# Patient Record
Sex: MALE | Race: WHITE | Employment: STUDENT | URBAN - METROPOLITAN AREA
[De-identification: names, ages, dates, MRNs, and addresses within clinical notes are randomized per-mention and may not be internally consistent; named-entity substitution may affect disease eponyms.]

---
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
## 2023-10-27 ENCOUNTER — HOSPITAL ENCOUNTER (EMERGENCY)
Facility: HOSPITAL | Age: 23
Discharge: HOME | End: 2023-10-27
Payer: COMMERCIAL

## 2023-10-27 ENCOUNTER — APPOINTMENT (OUTPATIENT)
Dept: RADIOLOGY | Facility: HOSPITAL | Age: 23
End: 2023-10-27
Payer: COMMERCIAL

## 2023-10-27 VITALS
DIASTOLIC BLOOD PRESSURE: 96 MMHG | RESPIRATION RATE: 16 BRPM | HEIGHT: 70 IN | TEMPERATURE: 97.5 F | OXYGEN SATURATION: 99 % | BODY MASS INDEX: 27.2 KG/M2 | WEIGHT: 190 LBS | SYSTOLIC BLOOD PRESSURE: 151 MMHG | HEART RATE: 69 BPM

## 2023-10-27 DIAGNOSIS — S89.91XA INJURY OF RIGHT KNEE, INITIAL ENCOUNTER: Primary | ICD-10-CM

## 2023-10-27 PROCEDURE — 82075 ASSAY OF BREATH ETHANOL: CPT

## 2023-10-27 PROCEDURE — 99283 EMERGENCY DEPT VISIT LOW MDM: CPT

## 2023-10-27 PROCEDURE — 73564 X-RAY EXAM KNEE 4 OR MORE: CPT | Mod: RIGHT SIDE | Performed by: RADIOLOGY

## 2023-10-27 PROCEDURE — 99283 EMERGENCY DEPT VISIT LOW MDM: CPT | Mod: 25

## 2023-10-27 PROCEDURE — 73564 X-RAY EXAM KNEE 4 OR MORE: CPT | Mod: RT

## 2023-10-27 ASSESSMENT — LIFESTYLE VARIABLES
HAVE PEOPLE ANNOYED YOU BY CRITICIZING YOUR DRINKING: NO
EVER FELT BAD OR GUILTY ABOUT YOUR DRINKING: NO
EVER HAD A DRINK FIRST THING IN THE MORNING TO STEADY YOUR NERVES TO GET RID OF A HANGOVER: NO
REASON UNABLE TO ASSESS: NO
HAVE YOU EVER FELT YOU SHOULD CUT DOWN ON YOUR DRINKING: NO

## 2023-10-27 ASSESSMENT — PAIN SCALES - GENERAL: PAINLEVEL_OUTOF10: 8

## 2023-10-27 ASSESSMENT — COLUMBIA-SUICIDE SEVERITY RATING SCALE - C-SSRS
1. IN THE PAST MONTH, HAVE YOU WISHED YOU WERE DEAD OR WISHED YOU COULD GO TO SLEEP AND NOT WAKE UP?: NO
2. HAVE YOU ACTUALLY HAD ANY THOUGHTS OF KILLING YOURSELF?: NO
6. HAVE YOU EVER DONE ANYTHING, STARTED TO DO ANYTHING, OR PREPARED TO DO ANYTHING TO END YOUR LIFE?: NO

## 2023-10-27 ASSESSMENT — PAIN - FUNCTIONAL ASSESSMENT: PAIN_FUNCTIONAL_ASSESSMENT: 0-10

## 2023-10-27 ASSESSMENT — PAIN DESCRIPTION - DESCRIPTORS: DESCRIPTORS: ACHING

## 2023-10-27 ASSESSMENT — PAIN DESCRIPTION - PAIN TYPE: TYPE: ACUTE PAIN

## 2023-10-27 ASSESSMENT — PAIN DESCRIPTION - ORIENTATION: ORIENTATION: RIGHT

## 2023-10-27 ASSESSMENT — PAIN DESCRIPTION - LOCATION: LOCATION: KNEE

## 2024-04-23 ENCOUNTER — HOSPITAL ENCOUNTER (EMERGENCY)
Age: 24
Discharge: HOME | End: 2024-04-23
Payer: COMMERCIAL

## 2024-04-23 ENCOUNTER — APPOINTMENT (OUTPATIENT)
Dept: CT IMAGING | Age: 24
End: 2024-04-23
Payer: COMMERCIAL

## 2024-04-23 VITALS
TEMPERATURE: 97.8 F | HEIGHT: 69 IN | DIASTOLIC BLOOD PRESSURE: 96 MMHG | OXYGEN SATURATION: 98 % | BODY MASS INDEX: 31.1 KG/M2 | HEART RATE: 77 BPM | SYSTOLIC BLOOD PRESSURE: 149 MMHG | WEIGHT: 210 LBS | RESPIRATION RATE: 18 BRPM

## 2024-04-23 DIAGNOSIS — M54.31 SCIATICA OF RIGHT SIDE: ICD-10-CM

## 2024-04-23 DIAGNOSIS — S39.012A STRAIN OF LUMBAR REGION, INITIAL ENCOUNTER: Primary | ICD-10-CM

## 2024-04-23 PROCEDURE — 99284 EMERGENCY DEPT VISIT MOD MDM: CPT

## 2024-04-23 PROCEDURE — 72131 CT LUMBAR SPINE W/O DYE: CPT

## 2024-04-23 ASSESSMENT — PAIN DESCRIPTION - DESCRIPTORS: DESCRIPTORS: SHOOTING

## 2024-04-23 ASSESSMENT — PAIN DESCRIPTION - LOCATION: LOCATION: BACK

## 2024-04-23 ASSESSMENT — PAIN DESCRIPTION - ORIENTATION: ORIENTATION: LOWER

## 2024-04-23 ASSESSMENT — LIFESTYLE VARIABLES
HOW MANY STANDARD DRINKS CONTAINING ALCOHOL DO YOU HAVE ON A TYPICAL DAY: 1 OR 2
HOW OFTEN DO YOU HAVE A DRINK CONTAINING ALCOHOL: MONTHLY OR LESS

## 2024-04-23 ASSESSMENT — PAIN - FUNCTIONAL ASSESSMENT: PAIN_FUNCTIONAL_ASSESSMENT: 0-10

## 2024-04-23 ASSESSMENT — PAIN SCALES - GENERAL: PAINLEVEL_OUTOF10: 7

## 2024-04-24 NOTE — ED PROVIDER NOTES
Independent  HPI:  4/24/24, Time: 3:20 AM EDT         Prince De Paz is a 24 y.o. male presenting to the ED for lower lumbar back pain.  Patient presents to the emergency department with complaints of back pain.  States that he believes he slipped a disc.  States Saturday he was lifting up boxes and the pain has continued and it is radiating.  He denies take anything at home for pain relief.  Denies any saddle anesthesia or any unexplained incontinence.  Patient without any fevers as well as no unusual rash.  Noted to be able to ambulate easily and independently.  Never seen a specialist before either.  Patient reports that he did not notify his employer on the date that it happened, on Saturday but states that it still hurting him so he came in today to be seen.  Review of Systems:   A complete review of systems was performed and pertinent positives and negatives are stated within HPI, all other systems reviewed and are negative.          --------------------------------------------- PAST HISTORY ---------------------------------------------  Past Medical History:  has no past medical history on file.    Past Surgical History:  has no past surgical history on file.    Social History:  reports that he has never smoked. He has never used smokeless tobacco. He reports that he does not drink alcohol and does not use drugs.    Family History: family history is not on file.     The patient’s home medications have been reviewed.    Allergies: Patient has no known allergies.    -------------------------------------------------- RESULTS -------------------------------------------------  All laboratory and radiology results have been personally reviewed by myself   LABS:  No results found for this visit on 04/23/24.    RADIOLOGY:  Interpreted by Radiologist.  CT LUMBAR SPINE WO CONTRAST   Final Result   Unremarkable non-contrast CT of the lumbar spine.             ------------------------- NURSING NOTES AND VITALS

## 2024-12-12 ENCOUNTER — HOSPITAL ENCOUNTER (EMERGENCY)
Age: 24
Discharge: HOME | End: 2024-12-13
Payer: OTHER MISCELLANEOUS

## 2024-12-12 ENCOUNTER — APPOINTMENT (OUTPATIENT)
Dept: GENERAL RADIOLOGY | Age: 24
End: 2024-12-12
Payer: OTHER MISCELLANEOUS

## 2024-12-12 VITALS
DIASTOLIC BLOOD PRESSURE: 75 MMHG | RESPIRATION RATE: 22 BRPM | OXYGEN SATURATION: 98 % | SYSTOLIC BLOOD PRESSURE: 134 MMHG | HEART RATE: 81 BPM | TEMPERATURE: 98.3 F

## 2024-12-12 DIAGNOSIS — S61.412A LACERATION OF LEFT HAND WITHOUT FOREIGN BODY, INITIAL ENCOUNTER: Primary | ICD-10-CM

## 2024-12-12 PROCEDURE — 12001 RPR S/N/AX/GEN/TRNK 2.5CM/<: CPT

## 2024-12-12 PROCEDURE — 73130 X-RAY EXAM OF HAND: CPT

## 2024-12-12 PROCEDURE — 99283 EMERGENCY DEPT VISIT LOW MDM: CPT

## 2024-12-13 NOTE — DISCHARGE INSTRUCTIONS
Please return to the ED with new or worsening symptoms. Follow up with PCP and occupational medicine for recheck.

## 2024-12-13 NOTE — ED PROVIDER NOTES
Independent [facility] Visit.        HPI:  12/12/24, Time: 11:03 PM EMPERATRIZ De Paz is a 24 y.o. male presenting to the ED for laceration to the left hand, beginning just prior to arrival after he cut his hand on a piece of metal at work.  The complaint has been persistent, mild in severity, and worsened by nothing.  Patient denies any pain to the area.  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  No numbness or tingling to left hand.  Tetanus immunization less than 5 years ago.  Afebrile without recent travel or sick contacts.  Patient denies all other symptoms and injuries at this time.    Review of Systems:   A complete review of systems was performed and pertinent positives and negatives are stated within HPI, all other systems reviewed and are negative.          --------------------------------------------- PAST HISTORY ---------------------------------------------  Past Medical History:  has no past medical history on file.    Past Surgical History:  has no past surgical history on file.    Social History:  reports that he has never smoked. He has never used smokeless tobacco. He reports that he does not drink alcohol and does not use drugs.    Family History: family history is not on file.     The patient’s home medications have been reviewed.    Allergies: Patient has no known allergies.    -------------------------------------------------- RESULTS -------------------------------------------------  All laboratory and radiology results have been personally reviewed by myself   LABS:  No results found for this visit on 12/12/24.    RADIOLOGY:  Interpreted by Radiologist.  XR HAND LEFT (MIN 3 VIEWS)   Final Result   Soft tissue prominence thenar eminence region.  No radiopaque foreign body.   No acute osseous findings.      RECOMMENDATION:   In the setting of recent trauma, if there is persistent symptoms and physical   exam warrants a

## 2024-12-13 NOTE — DISCHARGE INSTR - COC
Continuity of Care Form    Patient Name: Prince De Paz   :  2000  MRN:  [id number]    Admit date:  2024  Discharge date:  ***    Code Status Order: No Order   Advance Directives:   Advance Care Flowsheet Documentation             Admitting Physician:  No admitting provider for patient encounter.  PCP: No primary care provider on file.    Discharging Nurse: ***  Discharging Hospital Unit/Room#: HALL03/H3  Discharging Unit Phone Number: ***    Emergency Contact:   Extended Emergency Contact Information  Primary Emergency Contact: Gina De Paz Legal Guardian  Address: [address] [place] [address] [id number] [place] [address]  Home Phone: [phone]  Relation: Grandparent  Secondary Emergency Contact: Stephania Molina  Home Phone: [phone]  Relation: Aunt/Uncle    Past Surgical History:  History reviewed. No pertinent surgical history.    Immunization History:     There is no immunization history on file for this patient.    Active Problems:  There is no problem list on file for this patient.      Isolation/Infection:   Isolation            No Isolation          Patient Infection Status       None to display            Nurse Assessment:  Last Vital Signs: /75   Pulse 81   Temp 98.3 °F (36.8 °C)   Resp 22   SpO2 98%     Last documented pain score (0-10 scale):    Last Weight:   Wt Readings from Last 1 Encounters:   24 95.3 kg (210 lb)     Mental Status:  {IP PT MENTAL STATUS:}    IV Access:  { ERNA IV ACCESS:576238368}    Nursing Mobility/ADLs:  Walking   {CHP DME ADLs:131476361}  Transfer  {CHP DME ADLs:126233704}  Bathing  {CHP DME ADLs:692152777}  Dressing  {CHP DME ADLs:411225932}  Toileting  {CHP DME ADLs:032066261}  Feeding  {CHP DME ADLs:740334467}  Med Admin  {CHP DME ADLs:896734270}  Med Delivery   { ERNA MED Delivery:328059505}    Wound Care Documentation and Therapy:        Elimination:  Continence:   Bowel: {YES / NO:63421}  Bladder: {YES /  NO:}  Urinary Catheter: {Urinary Catheter:559592039}   Colostomy/Ileostomy/Ileal Conduit: {YES / NO:}       Date of Last BM: ***  No intake or output data in the 24 hours ending 24 0129  No intake/output data recorded.    Safety Concerns:     { ERNA Safety Concerns:546456533}    Impairments/Disabilities:      { ERNA Impairments/Disabilities:654297656}    Nutrition Therapy:  Current Nutrition Therapy:   { ERNA Diet List:122226345}    Routes of Feeding: {[facility] DME Other Feedings:449897957}  Liquids: {Slp liquid thickness:34380}  Daily Fluid Restriction: {[facility] DME Yes amt example:363672188}  Last Modified Barium Swallow with Video (Video Swallowing Test): {Done Not Done Date:}    Treatments at the Time of Hospital Discharge:   Respiratory Treatments: ***  Oxygen Therapy:  {Therapy; copd oxygen:88482}  Ventilator:    { CC Vent List:722637550}    Rehab Therapies: {THERAPEUTIC INTERVENTION:1963789218}  Weight Bearing Status/Restrictions: {[facility] Weight Bearin}  Other Medical Equipment (for information only, NOT a DME order):  {EQUIPMENT:650613852}  Other Treatments: ***    Patient's personal belongings (please select all that are sent with patient):  {[facility] DME Belongings:117050830}    RN SIGNATURE:  {Esignature:254332032}    CASE MANAGEMENT/SOCIAL WORK SECTION    Inpatient Status Date: ***    Readmission Risk Assessment Score:  [facility] RISK OF UNPLANNED READMISSION 2.0             0 Total Score        Discharging to Facility/ Agency   Name:   Address:  Phone:  Fax:    Dialysis Facility (if applicable)   Name:  Address:  Dialysis Schedule:  Phone:  Fax:    / signature: {Esignature:128011161}    PHYSICIAN SECTION    Prognosis: {Prognosis:1344293135}    Condition at Discharge: { Patient Condition:422317251}    Rehab Potential (if transferring to Rehab): {Prognosis:2908282336}    Recommended Labs or Other Treatments After Discharge: ***    Physician Certification: I

## 2025-02-19 ENCOUNTER — HOSPITAL ENCOUNTER (EMERGENCY)
Age: 25
Discharge: HOME | End: 2025-02-19
Payer: COMMERCIAL

## 2025-02-19 VITALS
WEIGHT: 220 LBS | HEART RATE: 102 BPM | DIASTOLIC BLOOD PRESSURE: 86 MMHG | BODY MASS INDEX: 32.49 KG/M2 | SYSTOLIC BLOOD PRESSURE: 126 MMHG | TEMPERATURE: 99.1 F | OXYGEN SATURATION: 99 % | RESPIRATION RATE: 20 BRPM

## 2025-02-19 DIAGNOSIS — J01.00 ACUTE NON-RECURRENT MAXILLARY SINUSITIS: Primary | ICD-10-CM

## 2025-02-19 DIAGNOSIS — J06.9 UPPER RESPIRATORY TRACT INFECTION, UNSPECIFIED TYPE: ICD-10-CM

## 2025-02-19 PROCEDURE — 99211 OFF/OP EST MAY X REQ PHY/QHP: CPT

## 2025-02-19 RX ORDER — BENZONATATE 200 MG/1
200 CAPSULE ORAL 3 TIMES DAILY PRN
Qty: 30 CAPSULE | Refills: 0 | Status: SHIPPED | OUTPATIENT
Start: 2025-02-19 | End: 2025-03-01

## 2025-02-19 RX ORDER — METHYLPREDNISOLONE 4 MG/1
TABLET ORAL
Qty: 1 KIT | Refills: 0 | Status: SHIPPED | OUTPATIENT
Start: 2025-02-19 | End: 2025-02-25

## 2025-02-19 RX ORDER — DOXYCYCLINE HYCLATE 100 MG
100 TABLET ORAL 2 TIMES DAILY
Qty: 20 TABLET | Refills: 0 | Status: SHIPPED | OUTPATIENT
Start: 2025-02-19 | End: 2025-03-01

## 2025-02-19 RX ORDER — ALBUTEROL SULFATE 90 UG/1
2 INHALANT RESPIRATORY (INHALATION) 4 TIMES DAILY PRN
Qty: 18 G | Refills: 0 | Status: SHIPPED | OUTPATIENT
Start: 2025-02-19

## 2025-02-19 ASSESSMENT — PAIN - FUNCTIONAL ASSESSMENT: PAIN_FUNCTIONAL_ASSESSMENT: NONE - DENIES PAIN

## 2025-02-19 NOTE — ED PROVIDER NOTES
Constitutional/General: Alert and oriented x3, well appearing, non toxic.  HEENT:  Airway patent.  Posterior pharynx with mild erythema without significant swelling or exudates.  Neck: Supple, full ROM, no meningeal signs.  Respiratory: Not in respiratory distress.  Lung sounds clear bilaterally  CV:  Regular rate. Regular rhythm.   Chest: No chest wall tenderness.  GI:  Abdomen Soft, nontender, Non distended.  No rebound, guarding, or rigidity.  Musculoskeletal: Moves all extremities x 4. Warm and well perfused, no clubbing, cyanosis, or edema.   Integument: Skin warm and dry. No rashes.   Lymphatic: No lymphadenopathy noted.  Neurologic: GCS 15, no focal deficits, symmetric strength 5/5 in the upper and lower extremities bilaterally.  Psychiatric: Normal Affect.    DIAGNOSTIC RESULTS   (All laboratory and radiology results have been personally reviewed by myself)  Labs:  No results found for this visit on 02/19/25.  Imaging:  All Radiology results interpreted by Radiologist unless otherwise noted.  No orders to display       ED COURSE   Vitals:    Vitals:    02/19/25 1258 02/19/25 1315   BP: 126/86    Pulse: (!) 110 (!) 102   Resp: 20    Temp: 99.1 °F (37.3 °C)    SpO2: 99%    Weight: 99.8 kg (220 lb)        Patient was given the following medications:  Medications - No data to display       PROCEDURES   none    REASSESSMENT   2/19/25       Time: 1310  Patient’s condition remains unchanged and remains stable.    CONSULTS:  None    DIFFERENTIAL DX_MDM   MDM:   Social Determinants: None    Records Reviewed: Source, electronic medical record    CC/HPI Summary, DDx, ED Course, Impression: Patient presents with Cough, Shortness of Breath, and Dizziness (Fever and chills, for over a week, sinus, NVD, achy)   Prince De Paz is a 25 y.o. male who presents to the ED with complaints of cough, shortness of breath, lightheadedness, fever or chills with sinus pain and pressure that has been ongoing for the past 10 days.

## 2025-02-26 ENCOUNTER — HOSPITAL ENCOUNTER (EMERGENCY)
Age: 25
Discharge: HOME | End: 2025-02-26
Payer: COMMERCIAL

## 2025-02-26 ENCOUNTER — APPOINTMENT (OUTPATIENT)
Dept: GENERAL RADIOLOGY | Age: 25
End: 2025-02-26
Payer: COMMERCIAL

## 2025-02-26 VITALS
OXYGEN SATURATION: 98 % | DIASTOLIC BLOOD PRESSURE: 83 MMHG | TEMPERATURE: 97.9 F | SYSTOLIC BLOOD PRESSURE: 124 MMHG | RESPIRATION RATE: 20 BRPM | WEIGHT: 220 LBS | HEART RATE: 97 BPM | BODY MASS INDEX: 32.49 KG/M2

## 2025-02-26 DIAGNOSIS — J40 BRONCHITIS: Primary | ICD-10-CM

## 2025-02-26 PROCEDURE — 6360000002 HC RX W HCPCS: Performed by: PHYSICIAN ASSISTANT

## 2025-02-26 PROCEDURE — 99211 OFF/OP EST MAY X REQ PHY/QHP: CPT

## 2025-02-26 PROCEDURE — 96372 THER/PROPH/DIAG INJ SC/IM: CPT

## 2025-02-26 PROCEDURE — 71046 X-RAY EXAM CHEST 2 VIEWS: CPT

## 2025-02-26 PROCEDURE — 6370000000 HC RX 637 (ALT 250 FOR IP): Performed by: PHYSICIAN ASSISTANT

## 2025-02-26 RX ORDER — PREDNISONE 20 MG/1
40 TABLET ORAL ONCE
Status: COMPLETED | OUTPATIENT
Start: 2025-02-26 | End: 2025-02-26

## 2025-02-26 RX ORDER — KETOROLAC TROMETHAMINE 30 MG/ML
30 INJECTION, SOLUTION INTRAMUSCULAR; INTRAVENOUS ONCE
Status: COMPLETED | OUTPATIENT
Start: 2025-02-26 | End: 2025-02-26

## 2025-02-26 RX ORDER — DEXTROMETHORPHAN HYDROBROMIDE AND PROMETHAZINE HYDROCHLORIDE 15; 6.25 MG/5ML; MG/5ML
5 SYRUP ORAL 4 TIMES DAILY PRN
Qty: 120 ML | Refills: 0 | Status: SHIPPED | OUTPATIENT
Start: 2025-02-26

## 2025-02-26 RX ORDER — PREDNISONE 20 MG/1
20 TABLET ORAL DAILY
Qty: 5 TABLET | Refills: 0 | Status: SHIPPED | OUTPATIENT
Start: 2025-02-27 | End: 2025-03-04

## 2025-02-26 RX ADMIN — KETOROLAC TROMETHAMINE 30 MG: 30 INJECTION, SOLUTION INTRAMUSCULAR at 20:10

## 2025-02-26 RX ADMIN — PREDNISONE 40 MG: 20 TABLET ORAL at 20:10

## 2025-02-26 ASSESSMENT — PAIN SCALES - GENERAL: PAINLEVEL_OUTOF10: 0

## 2025-02-26 ASSESSMENT — PAIN - FUNCTIONAL ASSESSMENT: PAIN_FUNCTIONAL_ASSESSMENT: 0-10

## 2025-02-27 NOTE — DISCHARGE INSTRUCTIONS
Take Tessalon for cough  Robitussin for chest congestion  Finish antibiotic  Start new steroid prescription tomorrow.  Start phenergan dm for cough (may cause drowsiness)  Continue inhaler  Tylenol 650 to 1000 mg every 8 hours as needed for pain/fever.  Ibuprofen 600 mg every 8 hours as needed for inflammation/pain/fever. Take with food and water.   If symptoms worsen go to the emergency department

## 2025-02-27 NOTE — ED PROVIDER NOTES
[facility] URGENT CARE  EMERGENCY DEPARTMENT ENCOUNTER        NAME: Prince De Paz  :  2000  MRN:  [id number]  Date of evaluation: 2025  Provider: Joss Dee PA-C  PCP: No primary care provider on file.  Note Started : 8:04 PM EST 25    Chief Complaint: Cough (Pt was prev here and was give atb with steriods. Pt stated his lungs/ribs are hurting him from coughing so much. Stated he becomes sob. )      This is a 25-year-old male who presents to urgent care stating that he was in the urgent care on 2025.  He he was diagnosed with a sinus infection along with an upper respiratory infection.  He was placed on antibiotic doxycycline which he is still taking.  He was prescribed a steroid which he finished yesterday. He is still using aninhaler and Tessalon Perles for the cough.  He states his cough has continued.  He complains of chest wall pain due to the coughing.  He denies any lightheadedness or dizziness.  No abdominal pain nausea vomiting diarrhea or urinary symptoms.  On first contact patient he appears to be in no acute distress        Review of Systems  Pertinent positives and negatives are stated within HPI, all other systems reviewed and are negative.     Allergies: Patient has no known allergies.     --------------------------------------------- PAST HISTORY ---------------------------------------------  Past Medical History:  has no past medical history on file.    Past Surgical History:  has no past surgical history on file.    Social History:  reports that he has quit smoking. His smoking use included cigarettes. He has never used smokeless tobacco. He reports that he does not drink alcohol and does not use drugs.    Family History: family history is not on file.     The patient’s home medications have been reviewed.    The nursing notes within the ED encounter have been reviewed.

## 2025-03-04 ENCOUNTER — APPOINTMENT (OUTPATIENT)
Dept: GENERAL RADIOLOGY | Age: 25
End: 2025-03-04
Payer: COMMERCIAL

## 2025-03-04 ENCOUNTER — HOSPITAL ENCOUNTER (EMERGENCY)
Age: 25
Discharge: HOME | End: 2025-03-04
Attending: STUDENT IN AN ORGANIZED HEALTH CARE EDUCATION/TRAINING PROGRAM
Payer: COMMERCIAL

## 2025-03-04 VITALS
TEMPERATURE: 97.3 F | BODY MASS INDEX: 31.5 KG/M2 | DIASTOLIC BLOOD PRESSURE: 95 MMHG | SYSTOLIC BLOOD PRESSURE: 144 MMHG | WEIGHT: 220 LBS | HEART RATE: 90 BPM | RESPIRATION RATE: 16 BRPM | HEIGHT: 70 IN | OXYGEN SATURATION: 97 %

## 2025-03-04 DIAGNOSIS — J18.9 PNEUMONIA DUE TO INFECTIOUS ORGANISM, UNSPECIFIED LATERALITY, UNSPECIFIED PART OF LUNG: Primary | ICD-10-CM

## 2025-03-04 LAB
ALBUMIN SERPL-MCNC: 4.5 G/DL (ref 3.5–5.2)
ALP SERPL-CCNC: 66 U/L (ref 40–129)
ALT SERPL-CCNC: 19 U/L (ref 0–40)
ANION GAP SERPL CALCULATED.3IONS-SCNC: 11 MMOL/L (ref 7–16)
AST SERPL-CCNC: 17 U/L (ref 0–39)
BASOPHILS # BLD: 0.06 K/UL (ref 0–0.2)
BASOPHILS NFR BLD: 1 % (ref 0–2)
BILIRUB SERPL-MCNC: 0.5 MG/DL (ref 0–1.2)
BILIRUB UR QL STRIP: NEGATIVE
BUN SERPL-MCNC: 12 MG/DL (ref 6–20)
CALCIUM SERPL-MCNC: 9.6 MG/DL (ref 8.6–10.2)
CHLORIDE SERPL-SCNC: 102 MMOL/L (ref 98–107)
CLARITY UR: CLEAR
CO2 SERPL-SCNC: 22 MMOL/L (ref 22–29)
COLOR UR: YELLOW
CREAT SERPL-MCNC: 0.8 MG/DL (ref 0.7–1.2)
D-DIMER QUANTITATIVE: <200 NG/ML DDU (ref 0–230)
EOSINOPHIL # BLD: 0.04 K/UL (ref 0.05–0.5)
EOSINOPHILS RELATIVE PERCENT: 0 % (ref 0–6)
ERYTHROCYTE [DISTWIDTH] IN BLOOD BY AUTOMATED COUNT: 13.1 % (ref 11.5–15)
FLUAV RNA RESP QL NAA+PROBE: NOT DETECTED
FLUBV RNA RESP QL NAA+PROBE: NOT DETECTED
GFR, ESTIMATED: >90 ML/MIN/1.73M2
GLUCOSE SERPL-MCNC: 109 MG/DL (ref 74–99)
GLUCOSE UR STRIP-MCNC: NEGATIVE MG/DL
HCT VFR BLD AUTO: 41.1 % (ref 37–54)
HGB BLD-MCNC: 14.5 G/DL (ref 12.5–16.5)
HGB UR QL STRIP.AUTO: NEGATIVE
IMM GRANULOCYTES # BLD AUTO: 0.04 K/UL (ref 0–0.58)
IMM GRANULOCYTES NFR BLD: 0 % (ref 0–5)
KETONES UR STRIP-MCNC: NEGATIVE MG/DL
LACTATE BLDV-SCNC: 1.1 MMOL/L (ref 0.5–2.2)
LEUKOCYTE ESTERASE UR QL STRIP: NEGATIVE
LIPASE SERPL-CCNC: 42 U/L (ref 13–60)
LYMPHOCYTES NFR BLD: 1.76 K/UL (ref 1.5–4)
LYMPHOCYTES RELATIVE PERCENT: 16 % (ref 20–42)
MCH RBC QN AUTO: 29.2 PG (ref 26–35)
MCHC RBC AUTO-ENTMCNC: 35.3 G/DL (ref 32–34.5)
MCV RBC AUTO: 82.9 FL (ref 80–99.9)
MONOCYTES NFR BLD: 0.53 K/UL (ref 0.1–0.95)
MONOCYTES NFR BLD: 5 % (ref 2–12)
NEUTROPHILS NFR BLD: 78 % (ref 43–80)
NEUTS SEG NFR BLD: 8.49 K/UL (ref 1.8–7.3)
NITRITE UR QL STRIP: NEGATIVE
PH UR STRIP: 6 [PH] (ref 5–8)
PLATELET # BLD AUTO: 245 K/UL (ref 130–450)
PMV BLD AUTO: 9.9 FL (ref 7–12)
POTASSIUM SERPL-SCNC: 3.8 MMOL/L (ref 3.5–5)
PROT SERPL-MCNC: 7.3 G/DL (ref 6.4–8.3)
PROT UR STRIP-MCNC: NEGATIVE MG/DL
RBC # BLD AUTO: 4.96 M/UL (ref 3.8–5.8)
RBC #/AREA URNS HPF: NORMAL /HPF
SARS-COV-2 RNA RESP QL NAA+PROBE: NOT DETECTED
SODIUM SERPL-SCNC: 135 MMOL/L (ref 132–146)
SOURCE: NORMAL
SP GR UR STRIP: 1.02 (ref 1–1.03)
SPECIMEN DESCRIPTION: NORMAL
TROPONIN I SERPL HS-MCNC: <6 NG/L (ref 0–11)
UROBILINOGEN UR STRIP-ACNC: 0.2 EU/DL (ref 0–1)
WBC #/AREA URNS HPF: NORMAL /HPF
WBC OTHER # BLD: 10.9 K/UL (ref 4.5–11.5)

## 2025-03-04 PROCEDURE — 83690 ASSAY OF LIPASE: CPT

## 2025-03-04 PROCEDURE — 96374 THER/PROPH/DIAG INJ IV PUSH: CPT

## 2025-03-04 PROCEDURE — 6360000002 HC RX W HCPCS: Performed by: STUDENT IN AN ORGANIZED HEALTH CARE EDUCATION/TRAINING PROGRAM

## 2025-03-04 PROCEDURE — 99285 EMERGENCY DEPT VISIT HI MDM: CPT

## 2025-03-04 PROCEDURE — 2580000003 HC RX 258: Performed by: STUDENT IN AN ORGANIZED HEALTH CARE EDUCATION/TRAINING PROGRAM

## 2025-03-04 PROCEDURE — 81001 URINALYSIS AUTO W/SCOPE: CPT

## 2025-03-04 PROCEDURE — 84484 ASSAY OF TROPONIN QUANT: CPT

## 2025-03-04 PROCEDURE — 83605 ASSAY OF LACTIC ACID: CPT

## 2025-03-04 PROCEDURE — 6370000000 HC RX 637 (ALT 250 FOR IP): Performed by: STUDENT IN AN ORGANIZED HEALTH CARE EDUCATION/TRAINING PROGRAM

## 2025-03-04 PROCEDURE — 85379 FIBRIN DEGRADATION QUANT: CPT

## 2025-03-04 PROCEDURE — 85025 COMPLETE CBC W/AUTO DIFF WBC: CPT

## 2025-03-04 PROCEDURE — 71046 X-RAY EXAM CHEST 2 VIEWS: CPT

## 2025-03-04 PROCEDURE — 87636 SARSCOV2 & INF A&B AMP PRB: CPT

## 2025-03-04 PROCEDURE — 93005 ELECTROCARDIOGRAM TRACING: CPT | Performed by: STUDENT IN AN ORGANIZED HEALTH CARE EDUCATION/TRAINING PROGRAM

## 2025-03-04 PROCEDURE — 80053 COMPREHEN METABOLIC PANEL: CPT

## 2025-03-04 RX ORDER — 0.9 % SODIUM CHLORIDE 0.9 %
1000 INTRAVENOUS SOLUTION INTRAVENOUS ONCE
Status: COMPLETED | OUTPATIENT
Start: 2025-03-04 | End: 2025-03-04

## 2025-03-04 RX ORDER — KETOROLAC TROMETHAMINE 30 MG/ML
15 INJECTION, SOLUTION INTRAMUSCULAR; INTRAVENOUS ONCE
Status: COMPLETED | OUTPATIENT
Start: 2025-03-04 | End: 2025-03-04

## 2025-03-04 RX ORDER — GUAIFENESIN 600 MG/1
1200 TABLET, EXTENDED RELEASE ORAL 2 TIMES DAILY
Qty: 40 TABLET | Refills: 0 | Status: SHIPPED | OUTPATIENT
Start: 2025-03-04 | End: 2025-03-14

## 2025-03-04 RX ORDER — DOXYCYCLINE 100 MG/1
100 CAPSULE ORAL ONCE
Status: COMPLETED | OUTPATIENT
Start: 2025-03-04 | End: 2025-03-04

## 2025-03-04 RX ORDER — BENZONATATE 100 MG/1
100 CAPSULE ORAL 3 TIMES DAILY PRN
Qty: 30 CAPSULE | Refills: 0 | Status: SHIPPED | OUTPATIENT
Start: 2025-03-04 | End: 2025-03-14

## 2025-03-04 RX ORDER — CEFDINIR 300 MG/1
300 CAPSULE ORAL 2 TIMES DAILY
Qty: 14 CAPSULE | Refills: 0 | Status: SHIPPED | OUTPATIENT
Start: 2025-03-04 | End: 2025-03-11

## 2025-03-04 RX ORDER — CEFDINIR 300 MG/1
300 CAPSULE ORAL ONCE
Status: COMPLETED | OUTPATIENT
Start: 2025-03-04 | End: 2025-03-04

## 2025-03-04 RX ORDER — DOXYCYCLINE HYCLATE 100 MG
100 TABLET ORAL 2 TIMES DAILY
Qty: 14 TABLET | Refills: 0 | Status: SHIPPED | OUTPATIENT
Start: 2025-03-04 | End: 2025-03-11

## 2025-03-04 RX ADMIN — CEFDINIR 300 MG: 300 CAPSULE ORAL at 04:20

## 2025-03-04 RX ADMIN — SODIUM CHLORIDE 1000 ML: 0.9 INJECTION, SOLUTION INTRAVENOUS at 03:14

## 2025-03-04 RX ADMIN — KETOROLAC TROMETHAMINE 15 MG: 30 INJECTION INTRAMUSCULAR; INTRAVENOUS at 03:15

## 2025-03-04 RX ADMIN — DOXYCYCLINE HYCLATE 100 MG: 100 CAPSULE ORAL at 04:20

## 2025-03-04 ASSESSMENT — PAIN SCALES - GENERAL: PAINLEVEL_OUTOF10: 6

## 2025-03-04 ASSESSMENT — PAIN - FUNCTIONAL ASSESSMENT: PAIN_FUNCTIONAL_ASSESSMENT: 0-10

## 2025-03-04 NOTE — ED PROVIDER NOTES
versus COVID versus flu versus PE versus pleural effusion.      CMP does not show any electrolyte abnormalities.  Troponin is negative.  LFTs are normal.  CBC does not show any leukocytosis or anemia.  COVID and flu testing negative.  D-dimer is also negative.  Chest x-ray does show developing pneumonia.  Patient is not hypoxic or in any acute distress.     Patient was treated IV fluids in addition to IV Toradol, oral Omnicef, and oral doxycycline.  Discussed results with patient.  He was feeling better.  He is not hypoxic.  He is in no acute distress.  Recommended close follow-up with family doctor and completion of antibiotics.  Strict return precautions were given and patient discharged home in stable condition.       CONSULTS: (Who and What was discussed)  None        I am the Primary Clinician of Record.    FINAL IMPRESSION      1. Pneumonia due to infectious organism, unspecified laterality, unspecified part of lung          DISPOSITION/PLAN     DISPOSITION Decision To Discharge 03/04/2025 04:31:53 AM      PATIENT REFERRED TO:  Follow up with your doctor or call [phone] for a family doctor.  Follow up with your doctor or call [phone] for a family doctor.  Schedule an appointment as soon as possible for a visit         DISCHARGE MEDICATIONS:  New Prescriptions    BENZONATATE (TESSALON) 100 MG CAPSULE    Take 1 capsule by mouth 3 times daily as needed for Cough    CEFDINIR (OMNICEF) 300 MG CAPSULE    Take 1 capsule by mouth 2 times daily for 7 days    DOXYCYCLINE HYCLATE (VIBRA-TABS) 100 MG TABLET    Take 1 tablet by mouth 2 times daily for 7 days    GUAIFENESIN (MUCINEX) 600 MG EXTENDED RELEASE TABLET    Take 2 tablets by mouth 2 times daily for 10 days       DISCONTINUED MEDICATIONS:  Discontinued Medications    No medications on file              (Please note that portions of this note were completed with a voice recognition program.  Efforts were made to edit the dictations but occasionally

## 2025-03-04 NOTE — DISCHARGE INSTRUCTIONS
Complete course of antibiotics.  Follow-up with family doctor.  Return for any significant worsening symptoms or other acute symptoms or concerns

## 2025-03-05 LAB
EKG ATRIAL RATE: 90 BPM
EKG P AXIS: 36 DEGREES
EKG P-R INTERVAL: 146 MS
EKG Q-T INTERVAL: 340 MS
EKG QRS DURATION: 84 MS
EKG QTC CALCULATION (BAZETT): 415 MS
EKG R AXIS: 36 DEGREES
EKG T AXIS: 33 DEGREES
EKG VENTRICULAR RATE: 90 BPM

## 2025-03-05 PROCEDURE — 93010 ELECTROCARDIOGRAM REPORT: CPT | Performed by: INTERNAL MEDICINE
